# Patient Record
Sex: MALE | Race: BLACK OR AFRICAN AMERICAN | URBAN - METROPOLITAN AREA
[De-identification: names, ages, dates, MRNs, and addresses within clinical notes are randomized per-mention and may not be internally consistent; named-entity substitution may affect disease eponyms.]

---

## 2017-06-19 ENCOUNTER — OFFICE VISIT (OUTPATIENT)
Dept: SURGERY | Age: 42
End: 2017-06-19

## 2017-06-19 VITALS
HEART RATE: 69 BPM | OXYGEN SATURATION: 99 % | TEMPERATURE: 98.1 F | HEIGHT: 74 IN | SYSTOLIC BLOOD PRESSURE: 143 MMHG | BODY MASS INDEX: 24.56 KG/M2 | WEIGHT: 191.4 LBS | DIASTOLIC BLOOD PRESSURE: 93 MMHG | RESPIRATION RATE: 19 BRPM

## 2017-06-19 DIAGNOSIS — K60.3 ANAL FISTULA: Primary | ICD-10-CM

## 2017-06-19 RX ORDER — METRONIDAZOLE 500 MG/1
500 TABLET ORAL 3 TIMES DAILY
Qty: 30 TAB | Refills: 0 | Status: SHIPPED | OUTPATIENT
Start: 2017-06-19 | End: 2017-06-29

## 2017-06-19 RX ORDER — CIPROFLOXACIN 500 MG/1
500 TABLET ORAL 2 TIMES DAILY
Qty: 20 TAB | Refills: 0 | Status: SHIPPED | OUTPATIENT
Start: 2017-06-19 | End: 2017-06-29

## 2017-06-19 NOTE — MR AVS SNAPSHOT
Visit Information Date & Time Provider Department Dept. Phone Encounter #  
 6/19/2017  3:00 PM Jackie Cannon MD [facility] [address] [id number] Upcoming Health Maintenance Date Due Pneumococcal 19-64 Medium Risk (1 of 1 - PPSV23) 1/6/1993 DTaP/Tdap/Td series (1 - Tdap) 1/6/1995 INFLUENZA AGE 9 TO ADULT 8/1/2017 Allergies as of 6/19/2017  Review Complete On: 6/19/2017 By: Ursula Paez LPN No Known Allergies Current Immunizations  Never Reviewed No immunizations on file. Not reviewed this visit You Were Diagnosed With   
  
 Codes Comments Anal fistula    -  Primary ICD-10-CM: K60.3 ICD-9-CM: 349.7 Vitals BP Pulse Temp Resp Height(growth percentile) Weight(growth percentile) (!) 143/93 69 98.1 °F (36.7 °C) (Oral) 19 6' 2\" (1.88 m) 191 lb 6.4 oz (86.8 kg) SpO2 BMI Smoking Status 99% 24.57 kg/m2 Current Every Day Smoker BMI and BSA Data Body Mass Index Body Surface Area 24.57 kg/m 2 2.13 m 2 Preferred Pharmacy Pharmacy Name Phone [id number]- [place], [place] - [address] [phone] Your Updated Medication List  
  
   
This list is accurate as of: 6/19/17  5:04 PM.  Always use your most recent med list.  
  
  
  
  
 ciprofloxacin HCl 500 mg tablet Commonly known as:  CIPRO Take 1 Tab by mouth two (2) times a day for 10 days. HYDROcodone-acetaminophen 5-325 mg per tablet Commonly known as:  [place] Take 1-2 Tabs by mouth every four (4) hours as needed for Pain. Max Daily Amount: 12 Tabs. metroNIDAZOLE 500 mg tablet Commonly known as:  FLAGYL Take 1 Tab by mouth three (3) times daily for 10 days. Prescriptions Sent to Pharmacy Refills  
 ciprofloxacin HCl (CIPRO) 500 mg tablet 0 Sig: Take 1 Tab by mouth two (2) times a day for 10 days.   
 Class: Normal  
 Pharmacy: [facility] [address] Ph #: [phone] Route: Oral  
 metroNIDAZOLE (FLAGYL) 500 mg tablet 0 Sig: Take 1 Tab by mouth three (3) times daily for 10 days. Class: Normal  
 Pharmacy: [facility] [id number] Ph #: [phone] Route: Oral  
  
We Performed the Following REFERRAL TO COLON AND RECTAL SURGERY [REF17 Custom] Comments:  
 Please evaluate patient for anal fistula. Referral Information Referral ID Referred By Referred To  
  
 [id number] [facility] [address] Visits Status Start Date End Date 1 New Request 6/19/17 6/19/18 If your referral has a status of pending review or denied, additional information will be sent to support the outcome of this decision. Introducing [facility]! Amy Romero introduces [employer] patient portal. Now you can access parts of your medical record, email your doctor's office, and request medication refills online. 1. In your internet browser, go to [email or web address] 2. Click on the First Time User? Click Here link in the Sign In box. You will see the New Member Sign Up page. 3. Enter your [employer] Access Code exactly as it appears below. You will not need to use this code after youve completed the sign-up process. If you do not sign up before the expiration date, you must request a new code. · [employer] Access Code: [email or web address] Expires: 9/17/2017  3:06 PM 
 
4. Enter the last four digits of your Social Security Number (xxxx) and Date of Birth (mm/dd/yyyy) as indicated and click Submit. You will be taken to the next sign-up page. 5. Create a Networkt ID. This will be your Network[employer] login ID and cannot be changed, so think of one that is secure and easy to remember. 6. Create a [employer] password. You can change your password at any time. 7. Enter your Password Reset Question and Answer. This can be used at a later time if you forget your password. 8. Enter your e-mail address. You will receive e-mail notification when new information is available in [address] 9. Click Sign Up. You can now view and download portions of your medical record. 10. Click the Download Summary menu link to download a portable copy of your medical information. If you have questions, please visit the Frequently Asked Questions section of the [employer] website. Remember, [employer] is NOT to be used for urgent needs. For medical emergencies, dial 911. Now available from your iPhone and Android! Please provide this summary of care documentation to your next provider. Your primary care clinician is listed as Alida Covarrubias. If you have any questions after today's visit, please call [phone].

## 2017-06-19 NOTE — PROGRESS NOTES
Chief Complaint   Patient presents with    Cyst     drainage     Pt was seen by provider for cyst. Pt still having some drainage from area, pt c/o mild pain in the area 5/10 at its worst.

## 2017-06-20 NOTE — PROGRESS NOTES
Patient is here for follow-up. Patient underwent I&D of left perianal abscess on 7/20/16. Still having intermittent drainage. Patient reports that the wound closes up and develop pressure in the area. The wound then reopens again and drains purulent material on and off. No F/C/S. No prior colonoscopy. Patient is currently not on any antibiotics. PE:  Visit Vitals    BP (!) 143/93    Pulse 69    Temp 98.1 °F (36.7 °C) (Oral)    Resp 19    Ht 6' 2\" (1.88 m)    Wt 86.8 kg (191 lb 6.4 oz)    SpO2 99%    BMI 24.57 kg/m2     Alert. NAD. Skin:  Left perianal wound well healed but with 1 mm sinus tract with purulent drainage. No erythema. +thick induration along the tract that extends towards the anal area.     Assessment:  Anal fistula    Plan:  -Cipro and flagyl  -sitz bath  -Refer to colo rectal